# Patient Record
Sex: FEMALE | Race: BLACK OR AFRICAN AMERICAN | NOT HISPANIC OR LATINO | Employment: UNEMPLOYED | URBAN - METROPOLITAN AREA
[De-identification: names, ages, dates, MRNs, and addresses within clinical notes are randomized per-mention and may not be internally consistent; named-entity substitution may affect disease eponyms.]

---

## 2018-10-08 ENCOUNTER — HOSPITAL ENCOUNTER (EMERGENCY)
Facility: HOSPITAL | Age: 63
Discharge: HOME | End: 2018-10-08
Attending: EMERGENCY MEDICINE
Payer: COMMERCIAL

## 2018-10-08 VITALS
WEIGHT: 189 LBS | BODY MASS INDEX: 29.66 KG/M2 | SYSTOLIC BLOOD PRESSURE: 113 MMHG | OXYGEN SATURATION: 97 % | DIASTOLIC BLOOD PRESSURE: 59 MMHG | HEIGHT: 67 IN | HEART RATE: 73 BPM | TEMPERATURE: 98 F | RESPIRATION RATE: 18 BRPM

## 2018-10-08 DIAGNOSIS — L03.114 CELLULITIS OF LEFT HAND: Primary | ICD-10-CM

## 2018-10-08 LAB
ALBUMIN SERPL-MCNC: 3.7 G/DL (ref 3.3–5.5)
ALP SERPL-CCNC: 74 U/L (ref 42–141)
BILIRUB SERPL-MCNC: 0.5 MG/DL (ref 0.2–1.6)
BUN SERPL-MCNC: 18 MG/DL (ref 7–22)
CALCIUM SERPL-MCNC: 9.8 MG/DL (ref 8–10.3)
CHLORIDE SERPL-SCNC: 105 MMOL/L (ref 98–108)
CREAT SERPL-MCNC: 1.2 MG/DL (ref 0.6–1.2)
CRP SERPL-MCNC: 3.7 MG/L
ERYTHROCYTE [SEDIMENTATION RATE] IN BLOOD BY WESTERGREN METHOD: 52 MM/HR
GLUCOSE SERPL-MCNC: 70 MG/DL (ref 73–118)
POC ALT (SGPT): 15 U/L (ref 10–47)
POC AST (SGOT): 22 U/L (ref 11–38)
POC TCO2: 26 MMOL/L (ref 18–33)
POTASSIUM BLD-SCNC: 3.8 MMOL/L (ref 3.6–5.1)
PROTEIN, POC: 7.6 G/DL (ref 6.4–8.1)
SODIUM BLD-SCNC: 145 MMOL/L (ref 128–145)
URATE SERPL-MCNC: 6.3 MG/DL

## 2018-10-08 PROCEDURE — 84550 ASSAY OF BLOOD/URIC ACID: CPT

## 2018-10-08 PROCEDURE — 85652 RBC SED RATE AUTOMATED: CPT

## 2018-10-08 PROCEDURE — 63600175 PHARM REV CODE 636 W HCPCS: Performed by: EMERGENCY MEDICINE

## 2018-10-08 PROCEDURE — 96375 TX/PRO/DX INJ NEW DRUG ADDON: CPT

## 2018-10-08 PROCEDURE — 99284 EMERGENCY DEPT VISIT MOD MDM: CPT | Mod: 25

## 2018-10-08 PROCEDURE — 96365 THER/PROPH/DIAG IV INF INIT: CPT

## 2018-10-08 PROCEDURE — 25000003 PHARM REV CODE 250: Performed by: EMERGENCY MEDICINE

## 2018-10-08 PROCEDURE — S0077 INJECTION, CLINDAMYCIN PHOSP: HCPCS | Performed by: EMERGENCY MEDICINE

## 2018-10-08 PROCEDURE — 86140 C-REACTIVE PROTEIN: CPT

## 2018-10-08 RX ORDER — MELOXICAM 15 MG/1
15 TABLET ORAL DAILY
Qty: 14 TABLET | Refills: 0 | Status: SHIPPED | OUTPATIENT
Start: 2018-10-08

## 2018-10-08 RX ORDER — EZETIMIBE AND SIMVASTATIN 10; 40 MG/1; MG/1
1 TABLET ORAL NIGHTLY
COMMUNITY

## 2018-10-08 RX ORDER — KETOROLAC TROMETHAMINE 30 MG/ML
30 INJECTION, SOLUTION INTRAMUSCULAR; INTRAVENOUS
Status: COMPLETED | OUTPATIENT
Start: 2018-10-08 | End: 2018-10-08

## 2018-10-08 RX ORDER — GLIMEPIRIDE 2 MG/1
2 TABLET ORAL
COMMUNITY

## 2018-10-08 RX ORDER — DICLOFENAC SODIUM 10 MG/G
2 GEL TOPICAL EVERY 6 HOURS PRN
Qty: 100 G | Refills: 0 | Status: SHIPPED | OUTPATIENT
Start: 2018-10-08

## 2018-10-08 RX ORDER — TRAMADOL HYDROCHLORIDE 50 MG/1
50 TABLET ORAL EVERY 6 HOURS PRN
Qty: 12 TABLET | Refills: 0 | Status: SHIPPED | OUTPATIENT
Start: 2018-10-08 | End: 2018-10-18

## 2018-10-08 RX ORDER — NEBIVOLOL 10 MG/1
10 TABLET ORAL DAILY
COMMUNITY

## 2018-10-08 RX ORDER — CLINDAMYCIN PHOSPHATE 900 MG/50ML
900 INJECTION, SOLUTION INTRAVENOUS
Status: COMPLETED | OUTPATIENT
Start: 2018-10-08 | End: 2018-10-08

## 2018-10-08 RX ORDER — CLINDAMYCIN HYDROCHLORIDE 300 MG/1
300 CAPSULE ORAL 4 TIMES DAILY
Qty: 40 CAPSULE | Refills: 0 | Status: SHIPPED | OUTPATIENT
Start: 2018-10-08 | End: 2018-10-18

## 2018-10-08 RX ORDER — INSULIN GLARGINE 300 [IU]/ML
54 INJECTION, SOLUTION SUBCUTANEOUS
COMMUNITY

## 2018-10-08 RX ORDER — PIOGLITAZONE HCL AND METFORMIN HCL 500; 15 MG/1; MG/1
1 TABLET ORAL 2 TIMES DAILY WITH MEALS
COMMUNITY

## 2018-10-08 RX ADMIN — CLINDAMYCIN IN 5 PERCENT DEXTROSE 900 MG: 18 INJECTION, SOLUTION INTRAVENOUS at 02:10

## 2018-10-08 RX ADMIN — KETOROLAC TROMETHAMINE 30 MG: 30 INJECTION, SOLUTION INTRAMUSCULAR; INTRAVENOUS at 02:10

## 2018-10-08 NOTE — ED NOTES
Pt presents with c/o swelling to L, hand x3 days; pt states swelling and pain has gradually worsened; denies injury to site; no redness or warmth noted; reports Hx of similar event; denies being seen by physician in the past with last event; pain rated at 10/10 on pain scale; pain located at joint of thumb and hand; described as intermittent, throbbing pain; reports use of OTC Tylenol with minimally control over pain; +2 radial pulse noted; skin tone normal for ethnicity; no bruising noted; no resp distress; resp E/U; pt on RA; NADN: will continue to monitor

## 2018-10-08 NOTE — ED PROVIDER NOTES
Encounter Date: 10/8/2018       History     Chief Complaint   Patient presents with    Hand Pain     pain and edema to the left hand since Friday morning     62 y.o. Female  With hypertension, hyperlipidemia, diabetes and others presents to emergency department complaining of acute, nontraumatic left hand  Pain and swelling that began 2 days ago.  She reports subsequent redness to the area the next day and progressively worsening pain today.  She states pain is worse with movement of her hand and is described is aching in nature.  She reports intermittent numbness but denies extremity weakness.  She further denies fever, rash, wound or repetitive use activity.  History of left middle finger trigger finger repair in 2017           Review of patient's allergies indicates:   Allergen Reactions    Sulfa (sulfonamide antibiotics)      Past Medical History:   Diagnosis Date    Diabetes mellitus type II     Hyperlipidemia     Hypertension     AUNG (obstructive sleep apnea)     Uses CPAP     Past Surgical History:   Procedure Laterality Date     SECTION, CLASSIC      x 3    DILATION AND CURETTAGE OF UTERUS      HAND SURGERY Left      Family History   Problem Relation Age of Onset    Diabetes Mother     Heart disease Mother     Heart failure Mother     Cancer Father         lung related to asbestos, prostate    Glaucoma Father     Heart attack Sister         MI at age 36 as cause of death    Diabetes Sister     Heart attack Brother         MI x 2 at age 56    Hypertension Brother     Hypertension Son      Social History     Tobacco Use    Smoking status: Never Smoker   Substance Use Topics    Alcohol use: Yes     Comment: Occasional    Drug use: No     Review of Systems   Constitutional: Negative for fever.   Respiratory: Negative for shortness of breath.    Cardiovascular: Negative for chest pain.   Musculoskeletal: Positive for arthralgias and joint swelling.   Skin: Positive for color change.  Negative for wound.   Neurological: Positive for numbness. Negative for weakness.   All other systems reviewed and are negative.      Physical Exam     Initial Vitals [10/08/18 0126]   BP Pulse Resp Temp SpO2   (!) 151/77 87 19 98.1 °F (36.7 °C) 97 %      MAP       --         Physical Exam    Nursing note and vitals reviewed.  Constitutional: She appears well-developed and well-nourished. She is not diaphoretic. No distress.   HENT:   Head: Normocephalic and atraumatic.   Eyes: Conjunctivae are normal. Pupils are equal, round, and reactive to light.   Neck: Normal range of motion. Neck supple.   Cardiovascular: Normal rate and intact distal pulses.   Pulmonary/Chest: No accessory muscle usage. No tachypnea. No respiratory distress.   Abdominal: She exhibits no distension. There is no tenderness.   Musculoskeletal: Normal range of motion.        Left hand: She exhibits tenderness and swelling. She exhibits normal range of motion, normal two-point discrimination and normal capillary refill. Normal sensation noted. Normal strength noted.        Hands:  Neurological: She is alert and oriented to person, place, and time. She has normal strength. Gait normal. GCS eye subscore is 4. GCS verbal subscore is 5. GCS motor subscore is 6.   Skin: Skin is warm. Capillary refill takes less than 2 seconds.   Psychiatric: She has a normal mood and affect.         ED Course   Procedures  Labs Reviewed   POCT CMP - Abnormal; Notable for the following components:       Result Value    POC Glucose 70 (*)     POC Sodium 145 (*)     All other components within normal limits   SEDIMENTATION RATE   C-REACTIVE PROTEIN   URIC ACID   POCT CBC   POCT CMP          Imaging Results          X-Ray Hand 3 view Left (Final result)  Result time 10/08/18 01:45:48    Final result by Geoffrey Bennett MD (10/08/18 01:45:48)                 Impression:      No acute bony abnormality.      Electronically signed by: Geoffrey Bennett  MD  Date:    10/08/2018  Time:    01:45             Narrative:    EXAMINATION:  XR HAND COMPLETE 3 VIEW LEFT    CLINICAL HISTORY:  left hand swelling;.    TECHNIQUE:  PA, lateral, and oblique views of the left hand were performed.    COMPARISON:  None    FINDINGS:  No evidence of acute fracture or dislocation.  Mild degenerative changes.  There is mild soft tissue swelling.  No radiopaque foreign body.                                 Medical Decision Making:   Clinical Tests:   Lab Tests: Ordered and Reviewed       <> Summary of Lab: White count 12.5 H&H 11 and 32.5 platelets 235 MCV 82.8 RDW 13  Radiological Study: Ordered and Reviewed              Labs Reviewed  Admission on 10/08/2018   Component Date Value Ref Range Status    Albumin, POC 10/08/2018 3.7  3.3 - 5.5 g/dL Final    Alkaline Phosphatase, POC 10/08/2018 74  42 - 141 U/L Final    ALT (SGPT), POC 10/08/2018 15  10 - 47 U/L Final    AST (SGOT), POC 10/08/2018 22  11 - 38 U/L Final    POC BUN 10/08/2018 18  7 - 22 mg/dL Final    Calcium, POC 10/08/2018 9.8  8.0 - 10.3 mg/dL Final    POC Chloride 10/08/2018 105  98 - 108 mmol/L Final    POC Creatinine 10/08/2018 1.2  0.6 - 1.2 mg/dL Final    POC Glucose 10/08/2018 70* 73 - 118 mg/dL Final    POC Potassium 10/08/2018 3.8  3.6 - 5.1 mmol/L Final    POC Sodium 10/08/2018 145* 128 - 145 mmol/L Final    Bilirubin 10/08/2018 0.5  0.2 - 1.6 mg/dL Final    POC TCO2 10/08/2018 26  18 - 33 mmol/L Final    Protein 10/08/2018 7.6  6.4 - 8.1 g/dL Final        Imaging Reviewed    Imaging Results          X-Ray Hand 3 view Left (Final result)  Result time 10/08/18 01:45:48    Final result by Geoffrey Bennett MD (10/08/18 01:45:48)                 Impression:      No acute bony abnormality.      Electronically signed by: Geoffrey Bennett MD  Date:    10/08/2018  Time:    01:45             Narrative:    EXAMINATION:  XR HAND COMPLETE 3 VIEW LEFT    CLINICAL HISTORY:  left hand swelling;.    TECHNIQUE:  PA,  lateral, and oblique views of the left hand were performed.    COMPARISON:  None    FINDINGS:  No evidence of acute fracture or dislocation.  Mild degenerative changes.  There is mild soft tissue swelling.  No radiopaque foreign body.                                Medications given in ED    Medications   clindamycin 900 MG/50 ML D5W 900 mg/50 mL IVPB 900 mg (900 mg Intravenous New Bag 10/8/18 0222)   ketorolac injection 30 mg (30 mg Intravenous Given 10/8/18 0220)       Note was created using voice recognition software. Note may have occasional typographical errors that may not have been identified and edited despite good eddy initial review prior to signing.           Discharge Medications        Medication List      START taking these medications    clindamycin 300 MG capsule  Commonly known as:  CLEOCIN  Take 1 capsule (300 mg total) by mouth 4 (four) times daily. for 10 days     diclofenac sodium 1 % Gel  Commonly known as:  VOLTAREN  Apply 2 g topically every 6 (six) hours as needed. pain     meloxicam 15 MG tablet  Commonly known as:  MOBIC  Take 1 tablet (15 mg total) by mouth once daily. Take with food     traMADol 50 mg tablet  Commonly known as:  ULTRAM  Take 1 tablet (50 mg total) by mouth every 6 (six) hours as needed for Pain.        ASK your doctor about these medications    amLODIPine 2.5 MG tablet  Commonly known as:  NORVASC  Take 1 tablet (2.5 mg total) by mouth once daily.     aspirin 81 MG EC tablet  Commonly known as:  ECOTRIN     atorvastatin 20 MG tablet  Commonly known as:  LIPITOR     biotin 5 mg Cap     blood sugar diagnostic Strp  Commonly known as:  ONETOUCH ULTRA TEST  To use as directed with One Touch meter and monitor blood sugars TID. Please provide with Lancets # 500 and 3 refills     DECARA 25,000 unit Cap  Generic drug:  cholecalciferol (vitamin D3)     ergocalciferol 50,000 unit Cap  Commonly known as:  ERGOCALCIFEROL  Take 1 capsule (50,000 Units total) by mouth every 7 days.  "For 12 weeks and then take vitamin D 2000 IU daily     ESTROVEN REGULAR STRENGTH ORAL     ezetimibe-simvastatin 10-40 mg 10-40 mg per tablet  Commonly known as:  VYTORIN     glimepiride 2 MG tablet  Commonly known as:  AMARYL     hydroCHLOROthiazide 12.5 MG Tab  Commonly known as:  HYDRODIURIL     insulin detemir U-100 100 unit/mL (3 mL) Inpn pen  Commonly known as:  LEVEMIR FLEXPEN  Inject 38 Units into the skin 2 (two) times daily.     METANX ORAL     multivitamin capsule     nebivolol 10 MG Tab  Commonly known as:  BYSTOLIC     NovoLOG Flexpen U-100 Insulin 100 unit/mL Inpn pen  Generic drug:  insulin aspart U-100  INJECT 20 UNITS UNDER THE SKIN THREE TIMES DAILY WITH MEALS     pen needle, diabetic 31 gauge x 3/16" Ndle  To use as directed with Levemir pen BID and Novolog pen TID     pioglitazone-metformin  mg per tablet  Commonly known as:  ACTOPLUS MET     quinapril 40 MG tablet  Commonly known as:  ACCUPRIL     SAXagliptin 5 mg Tab tablet  Commonly known as:  ONGLYZA  Take 1 tablet (5 mg total) by mouth once daily.     TOUJEO MAX U-300 SOLOSTAR 300 unit/mL (3 mL) Inpn  Generic drug:  insulin glargine U-300 conc           Where to Get Your Medications      These medications were sent to [place] [id number] [place] [address] [facility]  [address] [address]    Phone:  [phone]   · clindamycin 300 MG capsule  · diclofenac sodium 1 % Gel  · meloxicam 15 MG tablet     You can get these medications from any pharmacy    Bring a paper prescription for each of these medications  · traMADol 50 mg tablet               Patient discharged to home in stable condition with instructions to:   1. Please take all meds as prescribed.  2. Follow-up with your primary care doctor   3. Return precautions discussed and patient and/or family/caretaker understands to return to the emergency room for any concerns including worsening of your current symptoms, fever, chills, night sweats, worsening pain, " chest pain, shortness of breath, nausea, vomiting, diarrhea, bleeding, headache, difficulty talking, visual disturbances, weakness, numbness or any other acute concerns       Clinical Impression:   The encounter diagnosis was Cellulitis of left hand.                             Tiffani Kaba MD  10/15/18 4000

## 2019-04-22 ENCOUNTER — HOSPITAL ENCOUNTER (EMERGENCY)
Facility: HOSPITAL | Age: 64
Discharge: HOME | End: 2019-04-22
Attending: EMERGENCY MEDICINE
Payer: COMMERCIAL

## 2019-04-22 VITALS
HEIGHT: 66 IN | WEIGHT: 190 LBS | SYSTOLIC BLOOD PRESSURE: 136 MMHG | OXYGEN SATURATION: 100 % | HEART RATE: 76 BPM | DIASTOLIC BLOOD PRESSURE: 80 MMHG | RESPIRATION RATE: 20 BRPM | BODY MASS INDEX: 30.53 KG/M2 | TEMPERATURE: 99 F

## 2019-04-22 DIAGNOSIS — S61.210A LACERATION OF RIGHT INDEX FINGER WITHOUT FOREIGN BODY WITHOUT DAMAGE TO NAIL, INITIAL ENCOUNTER: Primary | ICD-10-CM

## 2019-04-22 PROCEDURE — 12001 RPR S/N/AX/GEN/TRNK 2.5CM/<: CPT | Mod: ER

## 2019-04-22 PROCEDURE — 99284 EMERGENCY DEPT VISIT MOD MDM: CPT | Mod: 25,ER

## 2019-04-22 PROCEDURE — 25000003 PHARM REV CODE 250: Mod: ER | Performed by: PHYSICIAN ASSISTANT

## 2019-04-22 RX ORDER — LIDOCAINE HYDROCHLORIDE 10 MG/ML
10 INJECTION INFILTRATION; PERINEURAL
Status: COMPLETED | OUTPATIENT
Start: 2019-04-22 | End: 2019-04-22

## 2019-04-22 RX ORDER — OXYCODONE AND ACETAMINOPHEN 5; 325 MG/1; MG/1
1 TABLET ORAL EVERY 6 HOURS PRN
Qty: 6 TABLET | Refills: 0 | Status: SHIPPED | OUTPATIENT
Start: 2019-04-22

## 2019-04-22 RX ORDER — BACITRACIN ZINC 500 UNIT/G
OINTMENT (GRAM) TOPICAL 2 TIMES DAILY
Qty: 15 G | Refills: 0 | Status: SHIPPED | OUTPATIENT
Start: 2019-04-22

## 2019-04-22 RX ORDER — CEPHALEXIN 500 MG/1
500 CAPSULE ORAL 3 TIMES DAILY
Qty: 15 CAPSULE | Refills: 0 | Status: SHIPPED | OUTPATIENT
Start: 2019-04-22 | End: 2019-04-27

## 2019-04-22 RX ADMIN — LIDOCAINE HYDROCHLORIDE 5 ML: 10 INJECTION, SOLUTION INFILTRATION; PERINEURAL at 05:04

## 2019-04-22 RX ADMIN — BACITRACIN, NEOMYCIN, POLYMYXIN B 2 EACH: 400; 3.5; 5 OINTMENT TOPICAL at 05:04

## 2019-04-22 NOTE — DISCHARGE INSTRUCTIONS
Keep area clean and dry. Ibuprofen or Tylenol for pain.  Percocet for severe pain. Apply antibiotic ointment twice daily with dressing changes.  Take all antibiotics as prescribed.  Try to take with meals to limit nausea.  Follow-up with your primary care physician for wound re-evaluation further recommendations.  Sutures need to be removed in 10-14 days.  Return to this ED if wound becomes red and warm, if pain increases, if you begin with fever, if wound begins to drain foul-smelling fluid, if any other problems occur.

## 2019-04-22 NOTE — ED PROVIDER NOTES
Encounter Date: 2019       History     Chief Complaint   Patient presents with    Finger Injury     pt c/o right index finger laceration 1 hour ago while cutting chicken.      63-year-old female with hyperlipidemia, hypertension, obstructive sleep apnea, insulin-dependent diabetes mellitus, with chief complaint finger laceration sustained 15-20 minutes prior to arrival.  Patient was cutting some chicken, accidentally sliced the dorsal aspect of the middle phalanx of her right hand.  She is left handed.  No uncontrolled bleeding.  No suspicion for foreign body.  Tetanus up-to-date.  Mild discomfort with palpation. No other complaints.  Pain described as an aching type pain, sharp with palpation. Symptoms are acute, constant, severity 5/10.  No recent antibiotics, recent hospitalization, no previous injury or surgery to digit.        Review of patient's allergies indicates:   Allergen Reactions    Sulfa (sulfonamide antibiotics)      Past Medical History:   Diagnosis Date    Diabetes mellitus type II     Hyperlipidemia     Hypertension     AUNG (obstructive sleep apnea)     Uses CPAP     Past Surgical History:   Procedure Laterality Date     SECTION, CLASSIC      x 3    DILATION AND CURETTAGE OF UTERUS      HAND SURGERY Left      Family History   Problem Relation Age of Onset    Diabetes Mother     Heart disease Mother     Heart failure Mother     Cancer Father         lung related to asbestos, prostate    Glaucoma Father     Heart attack Sister         MI at age 36 as cause of death    Diabetes Sister     Heart attack Brother         MI x 2 at age 56    Hypertension Brother     Hypertension Son      Social History     Tobacco Use    Smoking status: Never Smoker   Substance Use Topics    Alcohol use: Yes     Comment: Occasional    Drug use: No     Review of Systems   Constitutional: Negative for fever.   HENT: Negative for sore throat.    Eyes: Negative.    Respiratory: Negative for  shortness of breath.    Cardiovascular: Negative for chest pain.   Gastrointestinal: Negative for nausea.   Endocrine: Negative.    Genitourinary: Negative for dysuria.   Musculoskeletal: Negative for back pain, neck pain and neck stiffness.   Skin: Positive for wound. Negative for rash.   Neurological: Negative for weakness and headaches.   Hematological: Does not bruise/bleed easily.   Psychiatric/Behavioral: Negative.    All other systems reviewed and are negative.      Physical Exam     Initial Vitals [04/22/19 1714]   BP Pulse Resp Temp SpO2   (!) 153/88 94 18 99.1 °F (37.3 °C) 99 %      MAP       --         Physical Exam    Nursing note and vitals reviewed.  Constitutional: She appears well-developed and well-nourished. She is not diaphoretic. No distress.   Overall well-appearing, nontoxic.  Resting comfortably on exam table.   HENT:   Head: Normocephalic and atraumatic.   Eyes: Conjunctivae and EOM are normal. Pupils are equal, round, and reactive to light.   Neck: Normal range of motion. Neck supple. No tracheal deviation present.   Cardiovascular: Intact distal pulses.   Pulmonary/Chest: Breath sounds normal. No stridor. No respiratory distress.   Abdominal: Soft. Bowel sounds are normal. She exhibits no distension. There is no tenderness.   Musculoskeletal:   Right 2nd digit with curvilinear laceration overlying dorsal PIP.  No bony deformity.  No uncontrolled bleeding.  No foreign body.  Wound does enter dermis.  Normal sensation distally.  Full range of motion of digit. Cap refill normal to all digits.   Lymphadenopathy:     She has no cervical adenopathy.   Neurological: She is alert and oriented to person, place, and time.   Skin: Skin is warm and dry. Capillary refill takes less than 2 seconds.   Psychiatric: She has a normal mood and affect. Her behavior is normal. Judgment and thought content normal.         ED Course   Lac Repair  Date/Time: 4/22/2019 6:07 PM  Performed by: Jt Boston,  ALEAH  Authorized by: Paula Abdalla DO   Body area: upper extremity  Location details: right index finger  Laceration length: 1.5 cm  Foreign bodies: no foreign bodies  Tendon involvement: none  Nerve involvement: none  Vascular damage: no  Anesthesia: digital block    Anesthesia:  Local Anesthetic: lidocaine 1% without epinephrine  Anesthetic total: 4 mL  Patient sedated: no  Preparation: Patient was prepped and draped in the usual sterile fashion.  Irrigation solution: saline  Irrigation method: syringe  Amount of cleaning: standard  Debridement: minimal  Degree of undermining: none  Skin closure: 4-0 nylon  Number of sutures: 4  Technique: simple  Approximation: close  Approximation difficulty: simple  Dressing: antibiotic ointment, 4x4 sterile gauze and splint for protection  Patient tolerance: Patient tolerated the procedure well with no immediate complications        Labs Reviewed - No data to display       Imaging Results    None          Medical Decision Making:   Differential Diagnosis:   Finger laceration, infected wound, open fracture, contusion, abrasion  ED Management:  Simple laceration.  No evidence to suggest ligamentous or bony injury. Bleeding controlled.  I will DC with a short course of systemic antibiotics given diabetes, poultry related wound.  She will follow with her primary for re-evaluation, for suture removal.  Return precautions given.                      Clinical Impression:       ICD-10-CM ICD-9-CM   1. Laceration of right index finger without foreign body without damage to nail, initial encounter S61.210A 883.0         Disposition:   Disposition: Discharged  Condition: Stable                        Jt Boston PA-C  04/22/19 1802

## 2022-09-24 ENCOUNTER — IMMUNIZATION (OUTPATIENT)
Dept: INTERNAL MEDICINE | Facility: CLINIC | Age: 67
End: 2022-09-24
Payer: COMMERCIAL

## 2022-09-24 DIAGNOSIS — Z23 NEED FOR VACCINATION: Primary | ICD-10-CM

## 2022-09-24 PROCEDURE — 91312 COVID-19, MRNA, LNP-S, BIVALENT BOOSTER, PF, 30 MCG/0.3 ML DOSE: ICD-10-PCS | Mod: S$GLB,,, | Performed by: INTERNAL MEDICINE

## 2022-09-24 PROCEDURE — 91312 COVID-19, MRNA, LNP-S, BIVALENT BOOSTER, PF, 30 MCG/0.3 ML DOSE: CPT | Mod: S$GLB,,, | Performed by: INTERNAL MEDICINE

## 2022-09-24 PROCEDURE — 0124A COVID-19, MRNA, LNP-S, BIVALENT BOOSTER, PF, 30 MCG/0.3 ML DOSE: CPT | Mod: CV19,PBBFAC | Performed by: INTERNAL MEDICINE

## 2023-04-04 ENCOUNTER — TELEPHONE (OUTPATIENT)
Dept: ENDOSCOPY | Facility: HOSPITAL | Age: 68
End: 2023-04-04
Payer: COMMERCIAL

## 2023-04-04 NOTE — TELEPHONE ENCOUNTER
Medical assistant returned patient call that was left on voicemail someone at the home answer and stayed she was not home

## 2023-04-25 ENCOUNTER — TELEPHONE (OUTPATIENT)
Dept: ENDOSCOPY | Facility: HOSPITAL | Age: 68
End: 2023-04-25
Payer: COMMERCIAL

## 2023-05-03 ENCOUNTER — TELEPHONE (OUTPATIENT)
Dept: ENDOSCOPY | Facility: HOSPITAL | Age: 68
End: 2023-05-03
Payer: COMMERCIAL